# Patient Record
Sex: FEMALE | Race: WHITE | NOT HISPANIC OR LATINO | URBAN - METROPOLITAN AREA
[De-identification: names, ages, dates, MRNs, and addresses within clinical notes are randomized per-mention and may not be internally consistent; named-entity substitution may affect disease eponyms.]

---

## 2019-04-09 ENCOUNTER — OFFICE VISIT (OUTPATIENT)
Dept: URBAN - METROPOLITAN AREA CLINIC 62 | Facility: CLINIC | Age: 79
End: 2019-04-09
Payer: COMMERCIAL

## 2019-04-09 DIAGNOSIS — E11.9 TYPE 2 DIABETES MELLITUS WITHOUT COMPLICATIONS: Primary | Chronic | ICD-10-CM

## 2019-04-09 DIAGNOSIS — H25.813 COMBINED FORMS OF AGE-RELATED CATARACT, BILATERAL: Chronic | ICD-10-CM

## 2019-04-09 PROCEDURE — 92014 COMPRE OPH EXAM EST PT 1/>: CPT | Performed by: OPTOMETRIST

## 2019-04-09 PROCEDURE — 92004 COMPRE OPH EXAM NEW PT 1/>: CPT | Performed by: OPTOMETRIST

## 2019-04-09 ASSESSMENT — KERATOMETRY
OD: 44.88
OS: 44.38

## 2019-04-09 ASSESSMENT — INTRAOCULAR PRESSURE
OS: 15
OD: 15

## 2019-04-09 NOTE — IMPRESSION/PLAN
Impression: Type 2 diabetes mellitus without complications: Q56.9. OU. Plan: Diabetes type II: no background diabetic retinopathy, no signs of neovascularization noted. Discussed ocular and systemic benefits of blood sugar control. Continue to monitor for changes. Advised patient to RTC immediately if changes to vision are noted.

## 2019-04-09 NOTE — IMPRESSION/PLAN
Impression: Combined forms of age-related cataract, bilateral: H25.813. Plan: Cataracts account for the patient's complaints. Discussed all risks, benefits, procedures and recovery of cataracts surgery. Patient understands that changing the glasses prescription will not significantly improve vision. Patient desires to wait to have surgery. Advised to call for evaluation with Dr. Jaime Forman if patient desire surgery.

## 2021-05-12 ENCOUNTER — OFFICE VISIT (OUTPATIENT)
Dept: URBAN - METROPOLITAN AREA CLINIC 63 | Facility: CLINIC | Age: 81
End: 2021-05-12
Payer: COMMERCIAL

## 2021-05-12 DIAGNOSIS — H04.123 DRY EYE SYNDROME OF BILATERAL LACRIMAL GLANDS: Chronic | ICD-10-CM

## 2021-05-12 PROCEDURE — 99204 OFFICE O/P NEW MOD 45 MIN: CPT | Performed by: OPTOMETRIST

## 2021-05-12 ASSESSMENT — INTRAOCULAR PRESSURE
OD: 15
OS: 15

## 2021-05-12 NOTE — IMPRESSION/PLAN
Impression: Type 2 diabetes mellitus w/o complication: Q94.2. Plan: Diabetes type II: no background retinopathy, no signs of neovascularization noted. Discussed ocular and systemic benefits of blood sugar control.

## 2022-05-17 NOTE — IMPRESSION/PLAN
Impression: Type 2 diabetes mellitus w/o complication: N68.2. Plan: Diabetes type II: no background retinopathy, no signs of neovascularization noted. Discussed ocular and systemic benefits of blood sugar control.